# Patient Record
Sex: FEMALE | Race: BLACK OR AFRICAN AMERICAN | Employment: OTHER | URBAN - METROPOLITAN AREA
[De-identification: names, ages, dates, MRNs, and addresses within clinical notes are randomized per-mention and may not be internally consistent; named-entity substitution may affect disease eponyms.]

---

## 2022-05-25 ENCOUNTER — CONSULTATION/EVALUATION (OUTPATIENT)
Dept: URBAN - METROPOLITAN AREA CLINIC 43 | Facility: CLINIC | Age: 67
End: 2022-05-25

## 2022-05-25 DIAGNOSIS — H04.123: ICD-10-CM

## 2022-05-25 DIAGNOSIS — H25.813: ICD-10-CM

## 2022-05-25 DIAGNOSIS — H40.1132: ICD-10-CM

## 2022-05-25 PROCEDURE — 92136TC INTERFEROMETRY - TECHNICAL COMPONENT

## 2022-05-25 PROCEDURE — 92015 DETERMINE REFRACTIVE STATE: CPT

## 2022-05-25 PROCEDURE — 92014 COMPRE OPH EXAM EST PT 1/>: CPT

## 2022-05-25 PROCEDURE — 92134 CPTRZ OPH DX IMG PST SGM RTA: CPT

## 2022-05-25 PROCEDURE — 92250 FUNDUS PHOTOGRAPHY W/I&R: CPT

## 2022-05-25 PROCEDURE — 92025 CPTRIZED CORNEAL TOPOGRAPHY: CPT

## 2022-05-25 RX ORDER — CYCLOSPORINE 0 MG/ML: 1 SOLUTION/ DROPS OPHTHALMIC; TOPICAL

## 2022-05-25 RX ORDER — LOTEPREDNOL ETABONATE 5 MG/ML: 1 SUSPENSION/ DROPS OPHTHALMIC

## 2022-05-25 ASSESSMENT — VISUAL ACUITY
OS_SC: J12
OD_SC: J12
OS_SC: 20/70+2
OD_BAT: 20/400
OD_CC: 20/30-2
OD_SC: 20/70-2
OS_BAT: 20/400
OS_CC: 20/40-2
OS_CC: J8
OD_CC: J5

## 2022-05-25 ASSESSMENT — TONOMETRY
OS_IOP_MMHG: 10
OD_IOP_MMHG: 13

## 2022-05-25 NOTE — PATIENT DISCUSSION
History of Lupus. Refer to Rheumatologist for systemic medication due to having very dry eye and dry mouth symptom's. Patient states she has already been to a Rheumatologist in the past but no systemic medications were given.

## 2022-05-25 NOTE — PATIENT DISCUSSION
Will need to obtain outside records prior to surgery.  If cannot, then obtain HVF 24-2. Obtain RNFL today.

## 2022-05-25 NOTE — PATIENT DISCUSSION
Recommended artificial tears to use: 1 drop 4x a day in both eyes, WC, Cequa QID OU, and Lotemax SM OU 4xday for week, 3xday for week, 2xday for week, 1xday for week then stop .

## 2022-05-25 NOTE — PATIENT DISCUSSION
add Goniotomy and iStent to cataract surgery once glaucoma is confirmed.    Continue to follow with Dr. Reddy Benson.

## 2022-07-11 ENCOUNTER — FOLLOW UP (OUTPATIENT)
Dept: URBAN - METROPOLITAN AREA CLINIC 43 | Facility: CLINIC | Age: 67
End: 2022-07-11

## 2022-07-11 DIAGNOSIS — H04.123: ICD-10-CM

## 2022-07-11 DIAGNOSIS — H25.813: ICD-10-CM

## 2022-07-11 DIAGNOSIS — H40.1132: ICD-10-CM

## 2022-07-11 DIAGNOSIS — M35.00: ICD-10-CM

## 2022-07-11 PROCEDURE — 68761S PUNCTUM PLUG / SILICONE,EACH

## 2022-07-11 PROCEDURE — A4263 PERMANENT TEAR DUCT PLUG: HCPCS

## 2022-07-11 PROCEDURE — 99213 OFFICE O/P EST LOW 20 MIN: CPT

## 2022-07-11 PROCEDURE — 92025 CPTRIZED CORNEAL TOPOGRAPHY: CPT

## 2022-07-11 PROCEDURE — 92136TC INTERFEROMETRY - TECHNICAL COMPONENT

## 2022-07-11 ASSESSMENT — TONOMETRY
OS_IOP_MMHG: 17
OD_IOP_MMHG: 19

## 2022-07-11 ASSESSMENT — VISUAL ACUITY
OD_CC: 20/50-2
OS_CC: 20/40-2

## 2022-07-11 NOTE — PATIENT DISCUSSION
add Goniotomy and iStent to cataract surgery once glaucoma is confirmed.    Continue to follow with Dr. Juan Mathis.

## 2022-07-11 NOTE — PATIENT DISCUSSION
7/11/22 JOD: Patient has severe dry eye and ocular surface disease - recommend rheumatology consult to consider oral medication for Lupus. Patient states she has already been to a Rheumatologist in the past but no systemic medications were given.  May consider adding oral pilocarpine in future. Recommend Dr. Alexandria Curry in [place] or Dr. Shaina Cervantes; +STEPHAN, RF ([phone]). Eyes still significantly dry since last visit. Can not do cataract surgery until dry eye in under control. Can stop Lotemax. Recommend trying to send in [facility] again to [facility] FOR SURGERY. Use Cequa QID OU. Continue with artificial tears QID OU, add OTC gel at night time and Omega-3 supplement [facility]).

## 2022-07-11 NOTE — PATIENT DISCUSSION
7/11/22 JOD: Unable to obtain visual field, but based on RNFL patient has , at minimum, moderate glaucoma OD  and mild glaucoma OS. Continue Travatan Z qhs OU and monitor after cataract surgery with Dr. Velasquez Escalante.

## 2022-07-11 NOTE — PROCEDURE NOTE: CLINICAL
PROCEDURE NOTE: Punctal Plugs, Silicone OU. Diagnosis: Sjögren Syndrome, Unspecified. Anesthesia: Topical. Prep: Betadine Flush. Prior to treatment, the risks/benefits/alternatives were discussed. The patient wished to proceed with procedure. Permanent silicone plugs were inserted. Size/location of plugs inserted: 4.5UQ silicone OASIS plugs placed BLL. Lot # [id number] and exp 2027-02-24  Patient tolerated procedure well. There were no complications. Post procedure instructions given. Ming Fernández

## 2022-07-11 NOTE — PATIENT DISCUSSION
7/11/22 JOD: Eyes still with significant dryness today. Repeat measurements are still unreliable. Hold off on cataract surgery until Lupus and dry eye are under control.

## 2022-07-28 ENCOUNTER — ESTABLISHED PATIENT (OUTPATIENT)
Dept: URBAN - METROPOLITAN AREA CLINIC 44 | Facility: CLINIC | Age: 67
End: 2022-07-28

## 2022-07-28 DIAGNOSIS — L98.8: ICD-10-CM

## 2022-07-28 PROCEDURE — 99499 UNLISTED E&M SERVICE: CPT

## 2022-07-28 NOTE — PATIENT DISCUSSION
add Goniotomy and iStent to cataract surgery once glaucoma is confirmed.    Continue to follow with Dr. Vero Li.

## 2022-11-02 ENCOUNTER — FOLLOW UP (OUTPATIENT)
Dept: URBAN - METROPOLITAN AREA CLINIC 43 | Facility: CLINIC | Age: 67
End: 2022-11-02

## 2022-11-02 DIAGNOSIS — H25.813: ICD-10-CM

## 2022-11-02 DIAGNOSIS — H04.123: ICD-10-CM

## 2022-11-02 DIAGNOSIS — M35.00: ICD-10-CM

## 2022-11-02 DIAGNOSIS — H02.413: ICD-10-CM

## 2022-11-02 DIAGNOSIS — L98.8: ICD-10-CM

## 2022-11-02 DIAGNOSIS — H40.1132: ICD-10-CM

## 2022-11-02 PROCEDURE — 99199RSD RESIDENT SHADOWING PROVIDER

## 2022-11-02 PROCEDURE — 67999I ILUX- MGD

## 2022-11-02 PROCEDURE — 99213 OFFICE O/P EST LOW 20 MIN: CPT

## 2022-11-02 ASSESSMENT — VISUAL ACUITY
OS_CC: 20/30
OD_CC: 20/50+1

## 2022-11-02 ASSESSMENT — TONOMETRY
OS_IOP_MMHG: 15
OD_IOP_MMHG: 16

## 2022-11-02 NOTE — PATIENT DISCUSSION
11/02/22 JOD: PT saw rheumatologist and started oral pilocarpine. Dry eye is under control but there is still significant staining and plugged oil glands. Continue warm compresses qday, cequa qid, pilocarpine tid. Recommend iLUX to improve dryness. Patient elects to proceed with iLUX OU. Sample of flarex given to use bid until bottle runs out and patient can d/c.

## 2022-11-02 NOTE — PROCEDURE NOTE: CLINICAL
PROCEDURE NOTE: iLUX MGD Treatment #1 OU. Diagnosis: Meibomian Gland Dysfunction. The iLUX Device is indicated for the application of heat and pressure therapy in adult patients with chronic disease of the eyelids, including Meibomian Gland Dysfunction (MGD), also known as evaporative dry eye. Potential adverse reactions include eyelid/eye pain, irritation, and inflammation, as well as ocular surface irritation and inflammation. After the risks, benefits, and alternatives were explained, the patient decided to undergo the treatment and informed consent was obtained. A drop of Proparacaine Hydrochloride 0.5% was used to anesthetize the ocular surfaces and the treatment was performed successfully on the upper and lower eyelids of both eyes. An antibiotic and steroid drop was instilled in each treated eye at the conclusion of the case. The patient tolerated the procedure well and there were no complications. Rtia Smith

## 2022-11-02 NOTE — PATIENT DISCUSSION
5/25/22 JOD:History of Lupus. Refer to Rheumatologist for systemic medication due to having very dry eye and dry mouth symptom's. Patient states she has already been to a Rheumatologist in the past but no systemic medications were given.

## 2022-11-02 NOTE — PATIENT DISCUSSION
add Goniotomy and iStent to cataract surgery once glaucoma is confirmed.    Continue to follow with Dr. Gerda Bird.

## 2022-11-30 ENCOUNTER — FOLLOW UP (OUTPATIENT)
Dept: URBAN - METROPOLITAN AREA CLINIC 43 | Facility: CLINIC | Age: 67
End: 2022-11-30

## 2022-11-30 DIAGNOSIS — H02.883: ICD-10-CM

## 2022-11-30 DIAGNOSIS — H02.886: ICD-10-CM

## 2022-11-30 DIAGNOSIS — L98.8: ICD-10-CM

## 2022-11-30 DIAGNOSIS — H02.413: ICD-10-CM

## 2022-11-30 DIAGNOSIS — H04.123: ICD-10-CM

## 2022-11-30 DIAGNOSIS — M35.00: ICD-10-CM

## 2022-11-30 PROCEDURE — 99213 OFFICE O/P EST LOW 20 MIN: CPT

## 2022-11-30 PROCEDURE — 99199RSD RESIDENT SHADOWING PROVIDER

## 2022-11-30 ASSESSMENT — TONOMETRY
OS_IOP_MMHG: 8
OD_IOP_MMHG: 14

## 2022-11-30 ASSESSMENT — VISUAL ACUITY
OS_CC: 20/25-1
OD_CC: 20/30-2

## 2022-11-30 NOTE — PATIENT DISCUSSION
add Goniotomy and iStent to cataract surgery once glaucoma is confirmed.    Continue to follow with Dr. Georjean Meigs.

## 2022-11-30 NOTE — PATIENT DISCUSSION
11/30/22 JOD: PEE staining better centrally which accounts for improved vision. Would recommend PRP tears QID OU for more relief - would replace AT's. Would like to see dry eye a little more improved before considering surgery. Recommend moisture chamber goggles - continue all other dry eye treatment.

## 2023-04-13 ENCOUNTER — CONSULTATION/EVALUATION (OUTPATIENT)
Dept: URBAN - METROPOLITAN AREA CLINIC 44 | Facility: CLINIC | Age: 68
End: 2023-04-13

## 2023-04-13 DIAGNOSIS — H02.413: ICD-10-CM

## 2023-04-13 DIAGNOSIS — H04.123: ICD-10-CM

## 2023-04-13 DIAGNOSIS — L98.8: ICD-10-CM

## 2023-04-13 DIAGNOSIS — M35.00: ICD-10-CM

## 2023-04-13 PROCEDURE — 99499NC CONSULT, COSMETIC NO CHARGE

## 2023-04-13 ASSESSMENT — VISUAL ACUITY
OD_SC: 20/25
OS_SC: 20/20

## 2023-05-02 ENCOUNTER — PRE-OP/H&P (OUTPATIENT)
Dept: URBAN - METROPOLITAN AREA CLINIC 44 | Facility: CLINIC | Age: 68
End: 2023-05-02

## 2023-05-02 DIAGNOSIS — L98.8: ICD-10-CM

## 2023-05-02 PROCEDURE — 99211HP H&P OFFICE/OUTPATIENT VISIT, EST

## 2023-05-02 RX ORDER — ZOLPIDEM TARTRATE 10 MG/1: TABLET, FILM COATED ORAL

## 2023-05-02 RX ORDER — DOXYCYCLINE HYCLATE 100MG 100 MG/1: 1 CAPSULE ORAL TWICE A DAY

## 2023-05-02 RX ORDER — ONDANSETRON HYDROCHLORIDE 8 MG/1: TABLET, FILM COATED ORAL

## 2023-05-31 ENCOUNTER — PRE-OP/H&P (OUTPATIENT)
Dept: URBAN - METROPOLITAN AREA SURGERY 14 | Facility: SURGERY | Age: 68
End: 2023-05-31

## 2023-05-31 ENCOUNTER — SURGERY/PROCEDURE (OUTPATIENT)
Dept: URBAN - METROPOLITAN AREA SURGERY 14 | Facility: SURGERY | Age: 68
End: 2023-05-31

## 2023-05-31 DIAGNOSIS — H04.123: ICD-10-CM

## 2023-05-31 DIAGNOSIS — L98.8: ICD-10-CM

## 2023-05-31 DIAGNOSIS — H40.1132: ICD-10-CM

## 2023-05-31 DIAGNOSIS — M35.00: ICD-10-CM

## 2023-05-31 PROCEDURE — 99211T TECH SERVICE

## 2023-05-31 PROCEDURE — 15829F LOWER FACELIFT/PLATYSMAPLASTY ~ FEMALE

## 2023-06-01 ENCOUNTER — POST-OP (OUTPATIENT)
Dept: URBAN - METROPOLITAN AREA CLINIC 44 | Facility: CLINIC | Age: 68
End: 2023-06-01

## 2023-06-01 DIAGNOSIS — Z98.890: ICD-10-CM

## 2023-06-01 PROCEDURE — 99024 POSTOP FOLLOW-UP VISIT: CPT

## 2023-06-05 ENCOUNTER — POST-OP (OUTPATIENT)
Dept: URBAN - METROPOLITAN AREA CLINIC 39 | Facility: CLINIC | Age: 68
End: 2023-06-05

## 2023-06-05 DIAGNOSIS — Z98.890: ICD-10-CM

## 2023-06-05 PROCEDURE — 99024 POSTOP FOLLOW-UP VISIT: CPT

## 2023-06-08 ENCOUNTER — POST-OP (OUTPATIENT)
Dept: URBAN - METROPOLITAN AREA CLINIC 44 | Facility: CLINIC | Age: 68
End: 2023-06-08

## 2023-06-08 DIAGNOSIS — Z98.890: ICD-10-CM

## 2023-06-08 PROCEDURE — 99024 POSTOP FOLLOW-UP VISIT: CPT

## 2023-06-21 ENCOUNTER — POST-OP (OUTPATIENT)
Dept: URBAN - METROPOLITAN AREA CLINIC 39 | Facility: CLINIC | Age: 68
End: 2023-06-21

## 2023-06-21 DIAGNOSIS — Z98.890: ICD-10-CM

## 2023-06-21 PROCEDURE — 99024 POSTOP FOLLOW-UP VISIT: CPT

## 2023-07-20 ENCOUNTER — POST-OP (OUTPATIENT)
Dept: URBAN - METROPOLITAN AREA CLINIC 44 | Facility: CLINIC | Age: 68
End: 2023-07-20

## 2023-07-20 ENCOUNTER — PREPPED CHART (OUTPATIENT)
Dept: URBAN - METROPOLITAN AREA CLINIC 44 | Facility: CLINIC | Age: 68
End: 2023-07-20

## 2023-07-20 DIAGNOSIS — Z98.890: ICD-10-CM

## 2023-07-20 PROCEDURE — 99024 POSTOP FOLLOW-UP VISIT: CPT

## 2024-04-24 ENCOUNTER — CONSULTATION/EVALUATION (OUTPATIENT)
Dept: URBAN - METROPOLITAN AREA CLINIC 39 | Facility: CLINIC | Age: 69
End: 2024-04-24

## 2024-04-24 DIAGNOSIS — L98.8: ICD-10-CM

## 2024-04-24 PROCEDURE — 96999JE JEUVEAU INJECTION

## 2024-04-24 PROCEDURE — 99499CC150 CONSULT, COSMETIC

## 2024-07-24 ENCOUNTER — APPOINTMENT (RX ONLY)
Dept: URBAN - METROPOLITAN AREA CLINIC 331 | Facility: CLINIC | Age: 69
Setting detail: DERMATOLOGY
End: 2024-07-24

## 2024-07-24 DIAGNOSIS — L20.89 OTHER ATOPIC DERMATITIS: ICD-10-CM

## 2024-07-24 PROCEDURE — ? ADDITIONAL NOTES

## 2024-07-24 PROCEDURE — ? PRESCRIPTION

## 2024-07-24 PROCEDURE — ? COUNSELING

## 2024-07-24 PROCEDURE — ? RECOMMENDATIONS

## 2024-07-24 PROCEDURE — 99203 OFFICE O/P NEW LOW 30 MIN: CPT

## 2024-07-24 RX ORDER — BETAMETHASONE DIPROPIONATE 0.5 MG/G
OINTMENT TOPICAL BID
Qty: 45 | Refills: 2 | Status: ERX | COMMUNITY
Start: 2024-07-24

## 2024-07-24 RX ADMIN — BETAMETHASONE DIPROPIONATE: 0.5 OINTMENT TOPICAL at 00:00

## 2024-07-24 ASSESSMENT — LOCATION DETAILED DESCRIPTION DERM
LOCATION DETAILED: LEFT LATERAL PLANTAR MIDFOOT
LOCATION DETAILED: RIGHT MIDDLE FINGERTIP
LOCATION DETAILED: RIGHT LATERAL PLANTAR MIDFOOT
LOCATION DETAILED: LEFT DISTAL PALMAR INDEX FINGER

## 2024-07-24 ASSESSMENT — LOCATION ZONE DERM
LOCATION ZONE: FEET
LOCATION ZONE: FINGER

## 2024-07-24 ASSESSMENT — LOCATION SIMPLE DESCRIPTION DERM
LOCATION SIMPLE: LEFT INDEX FINGER
LOCATION SIMPLE: LEFT PLANTAR SURFACE
LOCATION SIMPLE: RIGHT PLANTAR SURFACE
LOCATION SIMPLE: RIGHT MIDDLE FINGER

## 2024-07-24 NOTE — PROCEDURE: RECOMMENDATIONS
Render Risk Assessment In Note?: no
Detail Level: Zone
Recommendation Preamble: The following recommendations were made during the visit: After applying the betamethasone ointment to feet, put on socks at night.

## 2024-07-24 NOTE — HPI: DRY SKIN
Is This A New Presentation Or A Follow-Up?: Dry Skin
Additional History: Pt reports in may developed an allergy to omezprazole\\Melita of June this all started

## 2024-07-24 NOTE — PROCEDURE: ADDITIONAL NOTES
Additional Notes: Patient reports this started after taking omeprazole.
Render Risk Assessment In Note?: no
Detail Level: Simple

## 2024-08-07 ENCOUNTER — APPOINTMENT (RX ONLY)
Dept: URBAN - METROPOLITAN AREA CLINIC 331 | Facility: CLINIC | Age: 69
Setting detail: DERMATOLOGY
End: 2024-08-07

## 2024-08-07 DIAGNOSIS — L20.89 OTHER ATOPIC DERMATITIS: ICD-10-CM | Status: STABLE

## 2024-08-07 PROBLEM — D48.5 NEOPLASM OF UNCERTAIN BEHAVIOR OF SKIN: Status: ACTIVE | Noted: 2024-08-07

## 2024-08-07 PROCEDURE — 99213 OFFICE O/P EST LOW 20 MIN: CPT

## 2024-08-07 PROCEDURE — ? PRESCRIPTION MEDICATION MANAGEMENT

## 2024-08-07 PROCEDURE — ? DEFER

## 2024-08-07 PROCEDURE — ? COUNSELING

## 2024-08-07 PROCEDURE — ? PRESCRIPTION

## 2024-08-07 RX ORDER — BETAMETHASONE DIPROPIONATE 0.5 MG/G
OINTMENT TOPICAL AS DIRECTED
Qty: 45 | Refills: 3 | Status: ERX

## 2024-08-07 ASSESSMENT — LOCATION DETAILED DESCRIPTION DERM
LOCATION DETAILED: LEFT LATERAL PLANTAR MIDFOOT
LOCATION DETAILED: LEFT DISTAL PALMAR INDEX FINGER
LOCATION DETAILED: RIGHT MIDDLE FINGERTIP
LOCATION DETAILED: RIGHT LATERAL PLANTAR MIDFOOT

## 2024-08-07 ASSESSMENT — LOCATION SIMPLE DESCRIPTION DERM
LOCATION SIMPLE: LEFT INDEX FINGER
LOCATION SIMPLE: RIGHT PLANTAR SURFACE
LOCATION SIMPLE: LEFT PLANTAR SURFACE
LOCATION SIMPLE: RIGHT MIDDLE FINGER

## 2024-08-07 ASSESSMENT — LOCATION ZONE DERM
LOCATION ZONE: FEET
LOCATION ZONE: FINGER

## 2024-08-07 NOTE — PROCEDURE: DEFER
Procedure To Be Performed At Next Visit: Biopsy by shave method
Size Of Lesion In Cm (Optional): 0
Detail Level: Detailed
Introduction Text (Please End With A Colon): The following procedure was deferred:
Instructions (Optional): Patient is getting a heart stent placed tomorrow, biopsy deferred today and to be scheduled in 3 weeks

## 2024-08-07 NOTE — PROCEDURE: PRESCRIPTION MEDICATION MANAGEMENT
Render In Strict Bullet Format?: No
Detail Level: Zone
Plan: Recommended Amlactin OTC
Continue Regimen: Betamethasone BID